# Patient Record
Sex: FEMALE | Race: WHITE | URBAN - METROPOLITAN AREA
[De-identification: names, ages, dates, MRNs, and addresses within clinical notes are randomized per-mention and may not be internally consistent; named-entity substitution may affect disease eponyms.]

---

## 2021-08-22 ENCOUNTER — WEB ENCOUNTER (OUTPATIENT)
Dept: URBAN - METROPOLITAN AREA CLINIC 13 | Facility: CLINIC | Age: 66
End: 2021-08-22

## 2021-08-25 ENCOUNTER — OFFICE VISIT (OUTPATIENT)
Dept: URBAN - NONMETROPOLITAN AREA CLINIC 13 | Facility: CLINIC | Age: 66
End: 2021-08-25
Payer: COMMERCIAL

## 2021-08-25 ENCOUNTER — LAB OUTSIDE AN ENCOUNTER (OUTPATIENT)
Dept: URBAN - NONMETROPOLITAN AREA CLINIC 13 | Facility: CLINIC | Age: 66
End: 2021-08-25

## 2021-08-25 ENCOUNTER — WEB ENCOUNTER (OUTPATIENT)
Dept: URBAN - NONMETROPOLITAN AREA CLINIC 13 | Facility: CLINIC | Age: 66
End: 2021-08-25

## 2021-08-25 VITALS
HEART RATE: 76 BPM | BODY MASS INDEX: 20.5 KG/M2 | DIASTOLIC BLOOD PRESSURE: 74 MMHG | WEIGHT: 138.4 LBS | HEIGHT: 69 IN | SYSTOLIC BLOOD PRESSURE: 120 MMHG

## 2021-08-25 DIAGNOSIS — Z12.11 COLON CANCER SCREENING: ICD-10-CM

## 2021-08-25 DIAGNOSIS — K76.9 LIVER LESION: ICD-10-CM

## 2021-08-25 PROCEDURE — 99243 OFF/OP CNSLTJ NEW/EST LOW 30: CPT | Performed by: INTERNAL MEDICINE

## 2021-08-25 NOTE — HPI-TODAY'S VISIT:
Ms. Crandall was referred by Dr. Marisa Freeman MD for evaluation of liver lesion noted on CT scan. A copy of this document will be sent to Dr. Marisa Freeman.  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  Ms. Crandall underwent a CT Chest given prior history of lung nodule. The CT Chest performed on 8/17/2021 was notable for "indeterminate right hepatic lobe 12 mm hypoenhancing lesion not a simple cyst." There was no evidence of cirrhosis on the CT scan. Ms. Crandall denies histroy of liver diseaes. She denies recent liver infection. She denies tobacco use, recreational drug use. She drinks alcohol occasionally.   She denies family history of liver disease or liver cancer.   Her underwent laboratory evaluation 6/2021 which showed normal CBC, chemistry panel, LFTs.   She underwent a cologuard in 2021 which was negative.   She previously worked as a  in [employer].

## 2021-08-26 ENCOUNTER — WEB ENCOUNTER (OUTPATIENT)
Dept: URBAN - NONMETROPOLITAN AREA CLINIC 13 | Facility: CLINIC | Age: 66
End: 2021-08-26

## 2021-08-27 ENCOUNTER — WEB ENCOUNTER (OUTPATIENT)
Dept: URBAN - NONMETROPOLITAN AREA CLINIC 13 | Facility: CLINIC | Age: 66
End: 2021-08-27

## 2021-10-13 ENCOUNTER — OFFICE VISIT (OUTPATIENT)
Dept: URBAN - NONMETROPOLITAN AREA CLINIC 13 | Facility: CLINIC | Age: 66
End: 2021-10-13

## 2021-11-24 ENCOUNTER — WEB ENCOUNTER (OUTPATIENT)
Dept: URBAN - NONMETROPOLITAN AREA CLINIC 13 | Facility: CLINIC | Age: 66
End: 2021-11-24

## 2021-11-24 ENCOUNTER — OFFICE VISIT (OUTPATIENT)
Dept: URBAN - NONMETROPOLITAN AREA CLINIC 13 | Facility: CLINIC | Age: 66
End: 2021-11-24

## 2021-11-24 NOTE — HPI-TODAY'S VISIT:
Ms. Crandall was referred by Dr. Marisa Freeman MD for evaluation of liver lesion noted on CT scan. A copy of this document will be sent to Dr. Marisa Freeman.   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  Ms. Crandall underwent a CT Chest given prior history of lung nodule. The CT Chest performed on 8/17/2021 was notable for "indeterminate right hepatic lobe 12 mm hypoenhancing lesion not a simple cyst." There was no evidence of cirrhosis on the CT scan. Ms. Crandall denies history of liver disease. She denies recent liver infection. She denies tobacco use, recreational drug use. She drinks alcohol occasionally.   She denies family history of liver disease or liver cancer.   Her underwent laboratory evaluation 6/2021 which showed normal CBC, chemistry panel, LFTs.   She underwent a cologuard in 2021 which was negative.   She previously worked as a  in [facility] [employer].  9/15/2021: MRI Abdomen with and without Contrast IMPRESSION:  The hepatic lesion seen on the chest CT from 8/17/2021 is a hemangioma.

## 2021-12-10 ENCOUNTER — WEB ENCOUNTER (OUTPATIENT)
Dept: URBAN - NONMETROPOLITAN AREA CLINIC 13 | Facility: CLINIC | Age: 66
End: 2021-12-10

## 2022-01-26 ENCOUNTER — OFFICE VISIT (OUTPATIENT)
Dept: URBAN - NONMETROPOLITAN AREA CLINIC 13 | Facility: CLINIC | Age: 67
End: 2022-01-26
Payer: COMMERCIAL

## 2022-01-26 VITALS
WEIGHT: 136 LBS | DIASTOLIC BLOOD PRESSURE: 81 MMHG | HEIGHT: 69 IN | BODY MASS INDEX: 20.14 KG/M2 | SYSTOLIC BLOOD PRESSURE: 118 MMHG | HEART RATE: 69 BPM

## 2022-01-26 DIAGNOSIS — Z12.11 COLON CANCER SCREENING: ICD-10-CM

## 2022-01-26 DIAGNOSIS — A31.0 MYCOBACTERIUM AVIUM-INTRACELLULARE INFECTION: ICD-10-CM

## 2022-01-26 DIAGNOSIS — K76.9 LIVER LESION: ICD-10-CM

## 2022-01-26 PROCEDURE — 99213 OFFICE O/P EST LOW 20 MIN: CPT | Performed by: INTERNAL MEDICINE

## 2022-01-26 NOTE — HPI-TODAY'S VISIT:
Ms. Crandall was referred by Dr. Marisa Freeman MD for evaluation of liver lesion noted on CT scan. A copy of this document will be sent to Dr. Marisa Freeman.      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  Ms. Crandall underwent a CT Chest given prior history of lung nodule. The CT Chest performed on 8/17/2021 was notable for "indeterminate right hepatic lobe 12 mm hypoenhancing lesion not a simple cyst." There was no evidence of cirrhosis on the CT scan. Ms. Crandall denies history of liver disease. She denies recent liver infection. She denies tobacco use, recreational drug use. She drinks alcohol occasionally.   She denies family history of liver disease or liver cancer.   She underwent laboratory evaluation 6/2021 which showed normal CBC, chemistry panel, LFTs.   She underwent a cologuard in 2021 which was negative.   She previously worked as a  in [employer].  9/15/2021: MRI Abdomen with and without Contrast IMPRESSION:  The hepatic lesion seen on the chest CT from 8/17/2021 is a hemangioma.  1/26/2022: Gastroenterology Follow-Up Visit  Ms. Crandall denies abdominal pain, reflux symptoms, nausea, vomiting, weight loss. Ms. 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 She is currently deciding whether to pursue treatment with antibiotics.

## 2022-05-04 ENCOUNTER — OFFICE VISIT (OUTPATIENT)
Dept: URBAN - NONMETROPOLITAN AREA CLINIC 13 | Facility: CLINIC | Age: 67
End: 2022-05-04

## 2022-05-11 ENCOUNTER — OFFICE VISIT (OUTPATIENT)
Dept: URBAN - NONMETROPOLITAN AREA CLINIC 13 | Facility: CLINIC | Age: 67
End: 2022-05-11
Payer: COMMERCIAL

## 2022-05-11 DIAGNOSIS — A31.0 MYCOBACTERIUM AVIUM-INTRACELLULARE INFECTION: ICD-10-CM

## 2022-05-11 DIAGNOSIS — K21.9 GASTROESOPHAGEAL REFLUX DISEASE, UNSPECIFIED WHETHER ESOPHAGITIS PRESENT: ICD-10-CM

## 2022-05-11 DIAGNOSIS — Z12.11 COLON CANCER SCREENING: ICD-10-CM

## 2022-05-11 DIAGNOSIS — K76.9 LIVER LESION: ICD-10-CM

## 2022-05-11 PROCEDURE — 99213 OFFICE O/P EST LOW 20 MIN: CPT | Performed by: INTERNAL MEDICINE

## 2022-05-11 RX ORDER — ALBUTEROL SULFATE 90 UG/1
1 PUFF AS NEEDED AEROSOL, METERED RESPIRATORY (INHALATION)
Status: ACTIVE | COMMUNITY

## 2022-05-11 NOTE — HPI-TODAY'S VISIT:
Ms. Crandall was referred by Dr. Marisa Freeman MD for evaluation of liver lesion noted on CT scan. A copy of this document will be sent to Dr. Marisa Freeman.        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  Ms. Crandall underwent a CT Chest given prior history of lung nodule. The CT Chest performed on 8/17/2021 was notable for "indeterminate right hepatic lobe 12 mm hypoenhancing lesion not a simple cyst." There was no evidence of cirrhosis on the CT scan. Ms. Crandall denies history of liver disease. She denies recent liver infection. She denies tobacco use, recreational drug use. She drinks alcohol occasionally.   She denies family history of liver disease or liver cancer.   She underwent laboratory evaluation 6/2021 which showed normal CBC, chemistry panel, LFTs.   She underwent a cologuard in 2021 which was negative.   She previously worked as a  in [employer].  9/15/2021: MRI Abdomen with and without Contrast IMPRESSION:  The hepatic lesion seen on the chest CT from 8/17/2021 is a hemangioma.  1/26/2022: Gastroenterology Follow-Up Visit  Ms. Crandall denies abdominal pain, reflux symptoms, nausea, vomiting, weight loss. Ms. 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 She is currently deciding whether to pursue treatment with antibiotics.  5/11/2022: Gastroenterology Follow-Up Visit  Ms. Crandall notes rare reflux-like symptoms. She is interested in pursuing EGD to assess if reflux may have contributed to her diagnosis of MAC.

## 2022-05-13 ENCOUNTER — WEB ENCOUNTER (OUTPATIENT)
Dept: URBAN - NONMETROPOLITAN AREA SURGERY CENTER 1 | Facility: SURGERY CENTER | Age: 67
End: 2022-05-13

## 2022-05-19 ENCOUNTER — OFFICE VISIT (OUTPATIENT)
Dept: URBAN - NONMETROPOLITAN AREA SURGERY CENTER 1 | Facility: SURGERY CENTER | Age: 67
End: 2022-05-19
Payer: COMMERCIAL

## 2022-05-19 DIAGNOSIS — K22.89 DILATION OF ESOPHAGUS: ICD-10-CM

## 2022-05-19 DIAGNOSIS — K31.89 ACQUIRED DEFORMITY OF DUODENUM: ICD-10-CM

## 2022-05-19 PROCEDURE — G8907 PT DOC NO EVENTS ON DISCHARG: HCPCS | Performed by: INTERNAL MEDICINE

## 2022-05-19 PROCEDURE — 43239 EGD BIOPSY SINGLE/MULTIPLE: CPT | Performed by: INTERNAL MEDICINE

## 2022-05-19 RX ORDER — ALBUTEROL SULFATE 90 UG/1
1 PUFF AS NEEDED AEROSOL, METERED RESPIRATORY (INHALATION)
Status: ACTIVE | COMMUNITY

## 2022-06-09 ENCOUNTER — TELEPHONE ENCOUNTER (OUTPATIENT)
Dept: URBAN - NONMETROPOLITAN AREA CLINIC 2 | Facility: CLINIC | Age: 67
End: 2022-06-09

## 2022-08-03 ENCOUNTER — OFFICE VISIT (OUTPATIENT)
Dept: URBAN - NONMETROPOLITAN AREA CLINIC 13 | Facility: CLINIC | Age: 67
End: 2022-08-03
Payer: COMMERCIAL

## 2022-08-03 VITALS
SYSTOLIC BLOOD PRESSURE: 108 MMHG | HEIGHT: 69 IN | BODY MASS INDEX: 20.03 KG/M2 | HEART RATE: 142 BPM | DIASTOLIC BLOOD PRESSURE: 76 MMHG | WEIGHT: 135.2 LBS

## 2022-08-03 DIAGNOSIS — K76.9 LIVER LESION: ICD-10-CM

## 2022-08-03 DIAGNOSIS — K21.9 GASTROESOPHAGEAL REFLUX DISEASE, UNSPECIFIED WHETHER ESOPHAGITIS PRESENT: ICD-10-CM

## 2022-08-03 DIAGNOSIS — A31.0 MYCOBACTERIUM AVIUM-INTRACELLULARE INFECTION: ICD-10-CM

## 2022-08-03 DIAGNOSIS — Z12.11 COLON CANCER SCREENING: ICD-10-CM

## 2022-08-03 PROCEDURE — 99213 OFFICE O/P EST LOW 20 MIN: CPT | Performed by: INTERNAL MEDICINE

## 2022-08-03 RX ORDER — SODIUM CHLORIDE FOR INHALATION 7 %
INHALE 1 VIAL VIA NEBULIZER TWO TIMES A DAY VIAL, NEBULIZER (ML) INHALATION
Qty: 240 UNSPECIFIED | Refills: 2 | Status: ACTIVE | COMMUNITY

## 2022-08-03 RX ORDER — ALBUTEROL SULFATE 2.5 MG/3ML
INHALE ONE VIAL VIA NEBULIZER EVERY 6 HOURS AS NEEDED FOR WHEEZING SOLUTION RESPIRATORY (INHALATION)
Qty: 360 UNSPECIFIED | Refills: 1 | Status: ACTIVE | COMMUNITY

## 2022-08-03 NOTE — HPI-TODAY'S VISIT:
Ms. Crandall was referred by Dr. Marisa Freeman MD for evaluation of liver lesion noted on CT scan. A copy of this document will be sent to Dr. Marisa Freeman.         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  Ms. Crandall underwent a CT Chest given prior history of lung nodule. The CT Chest performed on 8/17/2021 was notable for "indeterminate right hepatic lobe 12 mm hypoenhancing lesion not a simple cyst." There was no evidence of cirrhosis on the CT scan. Ms. Crandall denies history of liver disease. She denies recent liver infection. She denies tobacco use, recreational drug use. She drinks alcohol occasionally.   She denies family history of liver disease or liver cancer.   She underwent laboratory evaluation 6/2021 which showed normal CBC, chemistry panel, LFTs.   She underwent a cologuard in 2021 which was negative.   She previously worked as a  in [employer].  9/15/2021: MRI Abdomen with and without Contrast IMPRESSION:  The hepatic lesion seen on the chest CT from 8/17/2021 is a hemangioma.  1/26/2022: Gastroenterology Follow-Up Visit  Ms. Crandall denies abdominal pain, reflux symptoms, nausea, vomiting, weight loss. Ms. 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 She is currently deciding whether to pursue treatment with antibiotics.  5/11/2022: Gastroenterology Follow-Up Visit  Ms. Crandall notes rare reflux-like symptoms. She is interested in pursuing EGD to assess if reflux may have contributed to her diagnosis of MAC.  5/19/2022: EGD The examined esophagus was normal. Proximal and distal esophageal biopsies obtained. Erythematous mucosa in the stomach. Biopsied. The examined duodenum was normal.  5/19/2022: Pathology from EGD A.	Stomach, Antrum, Biopsy: Gastric mucosa with slight, non-specific inflammation. No Helicobacter pylori identified. B.	Distal Esophagus, Biopsy: Squamous epithelium with slight chronic inflammation. No intestinal metaplasia.  C.	Proximal Esophagus, Biopsy: Squamous epithelium with slight chronic inflammation. No intestinal metaplasia.   8/3/2022: Gastroenterology Follow-Up Visit. Ms. Crandall took omeprazole for 30 days to treat the squamous epithelium with slight chronic inflammation noted on EGD with proximal/distal esophageal biopsies. She currently denies reflux symptoms. Denies dysphagia. Denies unintentional weight loss. She is going to be seeing a speicalist in [place], [place] regarding her myobacterium avium intracellulare infection.

## 2022-08-13 ENCOUNTER — DASHBOARD ENCOUNTERS (OUTPATIENT)
Age: 67
End: 2022-08-13

## 2022-08-13 PROBLEM — 235595009: Status: ACTIVE | Noted: 2022-05-11

## 2022-08-13 PROBLEM — 371686006: Status: ACTIVE | Noted: 2022-02-10

## 2022-08-13 PROBLEM — 305058001: Status: ACTIVE | Noted: 2021-08-28

## 2022-08-13 PROBLEM — 300331000: Status: ACTIVE | Noted: 2021-08-25

## 2023-02-02 ENCOUNTER — OFFICE VISIT (OUTPATIENT)
Dept: URBAN - NONMETROPOLITAN AREA CLINIC 2 | Facility: CLINIC | Age: 68
End: 2023-02-02

## 2023-04-10 ENCOUNTER — OFFICE VISIT (OUTPATIENT)
Dept: URBAN - NONMETROPOLITAN AREA CLINIC 13 | Facility: CLINIC | Age: 68
End: 2023-04-10

## 2023-04-17 ENCOUNTER — OFFICE VISIT (OUTPATIENT)
Dept: URBAN - NONMETROPOLITAN AREA CLINIC 13 | Facility: CLINIC | Age: 68
End: 2023-04-17